# Patient Record
(demographics unavailable — no encounter records)

---

## 2025-02-04 NOTE — DISCUSSION/SUMMARY
[FreeTextEntry1] : 17-year-old female presenting with headache  Headache -No acute concern on assessment -Possibly related to lack of intake and viral process -Ibuprofen 400 mg 1 tab po x1 dispensed.  -Counseled regarding eating regular meals including breakfast, increase hydration, exercise regularly, reduce stress, and avoid triggers.   Patient with appointment with PCP this Friday, counseled to bring discharge papers with her from [place] to appointment to provide details of ED visit

## 2025-02-04 NOTE — HISTORY OF PRESENT ILLNESS
[de-identified] : headache [FreeTextEntry6] : 17-year-old female presenting with complaints of a runny nose, headache, cough.  Runny nose and cough x 2 weeks Patient reports overall her symptoms are improving but she still has a headache  Patient report headache started this past Friday when she came to school but reports she was fine during the weekend and when she gets home after eating. Reports when she came into the school building today, she began to experience the headache again. Patient reports headache of 10/10 around perimeter of head. Denies any current stressors in school. -Patient denies any associated itchy eyes or throat, runny nose -Denies sensitivity to light, denies changes in vision, weakness or dizziness -Denies nausea or vomiting -Went to bed at 8pm -Patient reports she did not eat breakfast or lunch today -Dinner last night: plantain and eggs -Had a few sips of water today  Patient reports she tested positive for the flu about 2 weeks ago. Patient reports she initially went to urgent care because she had a fever x1 week and was also vomiting blood. Patient reports from the urgent care she was transferred to the hospital and went to [facility].  -Patient reports during the stay she was told she has the flu. -Patient reports she was prescribed medication for migraines but did not take it today- unsure of the name -Patient reports she was prescribed other medication but does not know the names- reports one was for the flu and was told to take it for 3 weeks -Patient unsure of any of the other diagnoses and direction for follow-up  -Reports she has an appointment to follow-up with her PCP this Friday   LMP: 1/14/25

## 2025-02-04 NOTE — REVIEW OF SYSTEMS
[Headache] : headache [Nasal Discharge] : nasal discharge [Cough] : cough [Fever] : no fever [Changes in Vision] : no changes in vision [Nasal Congestion] : no nasal congestion [Sore Throat] : no sore throat [PO Intolerance] : PO tolerance [Vomiting] : no vomiting [Abdominal Pain] : no abdominal pain [Weakness] : no weakness [Lightheadness] : no lightheadness [Dizziness] : no dizziness

## 2025-02-04 NOTE — RISK ASSESSMENT
[Has family members/adults to turn to for help] : has family members/adults to turn to for help [Grade: ____] : Grade: [unfilled] [Eats regular meals including adequate fruits and vegetables] : eats regular meals including adequate fruits and vegetables [Drinks non-sweetened liquids] : drinks non-sweetened liquids  [Calcium source] : calcium source [Has friends] : has friends [Home is free of violence] : home is free of violence [Has peer relationships free of violence] : has peer relationships free of violence [Has ways to cope with stress] : has ways to cope with stress [With Teen] : teen [Has concerns about body or appearance] : does not have concerns about body or appearance [Uses tobacco] : does not use tobacco [Uses drugs] : does not use drugs  [Drinks alcohol] : does not drink alcohol [Has/had oral sex] : has not had oral sex [Has had sexual intercourse] : has not had sexual intercourse [Gets depressed, anxious, or irritable/has mood swings] : does not get depressed, anxious, or irritable/has mood swings [Has thought about hurting self or considered suicide] : has not thought about hurting self or considered suicide [de-identified] : Lives with mother, sister and littler brother-feels safe at home [de-identified] : Renato JACOBSEN- Going to [employer] or [employer] [facility] [employer] [de-identified] : Denies food insecurity, eats 2 meals a day because she skips the school lunch [de-identified] : denies access to guns [de-identified] : attracted to boys

## 2025-02-04 NOTE — PHYSICAL EXAM
[EOMI] : grossly EOMI [Moves All Extremities x 4] : moves all extremities x4 [Warm, Well Perfused x4] : warm, well perfused x4 [NL] : normotonic [Normotonic] : normotonic [+2 Patella DTR] : +2 patella DTR [Erythematous Oropharynx] : nonerythematous oropharynx [FreeTextEntry5] : KYLIE